# Patient Record
Sex: MALE | Race: WHITE | NOT HISPANIC OR LATINO
[De-identification: names, ages, dates, MRNs, and addresses within clinical notes are randomized per-mention and may not be internally consistent; named-entity substitution may affect disease eponyms.]

---

## 2018-12-17 ENCOUNTER — TRANSCRIPTION ENCOUNTER (OUTPATIENT)
Age: 53
End: 2018-12-17

## 2018-12-26 ENCOUNTER — TRANSCRIPTION ENCOUNTER (OUTPATIENT)
Age: 53
End: 2018-12-26

## 2019-01-02 ENCOUNTER — APPOINTMENT (OUTPATIENT)
Dept: INTERNAL MEDICINE | Facility: CLINIC | Age: 54
End: 2019-01-02

## 2019-01-02 PROBLEM — Z00.00 ENCOUNTER FOR PREVENTIVE HEALTH EXAMINATION: Status: ACTIVE | Noted: 2019-01-02

## 2019-11-17 ENCOUNTER — TRANSCRIPTION ENCOUNTER (OUTPATIENT)
Age: 54
End: 2019-11-17

## 2019-12-09 ENCOUNTER — TRANSCRIPTION ENCOUNTER (OUTPATIENT)
Age: 54
End: 2019-12-09

## 2020-01-26 ENCOUNTER — TRANSCRIPTION ENCOUNTER (OUTPATIENT)
Age: 55
End: 2020-01-26

## 2020-05-13 ENCOUNTER — TRANSCRIPTION ENCOUNTER (OUTPATIENT)
Age: 55
End: 2020-05-13

## 2020-12-28 ENCOUNTER — FORM ENCOUNTER (OUTPATIENT)
Age: 55
End: 2020-12-28

## 2020-12-29 ENCOUNTER — APPOINTMENT (OUTPATIENT)
Dept: INTERNAL MEDICINE | Facility: CLINIC | Age: 55
End: 2020-12-29
Payer: COMMERCIAL

## 2020-12-29 ENCOUNTER — TRANSCRIPTION ENCOUNTER (OUTPATIENT)
Age: 55
End: 2020-12-29

## 2020-12-29 DIAGNOSIS — Z80.3 FAMILY HISTORY OF MALIGNANT NEOPLASM OF BREAST: ICD-10-CM

## 2020-12-29 DIAGNOSIS — Z87.898 PERSONAL HISTORY OF OTHER SPECIFIED CONDITIONS: ICD-10-CM

## 2020-12-29 DIAGNOSIS — J45.909 UNSPECIFIED ASTHMA, UNCOMPLICATED: ICD-10-CM

## 2020-12-29 DIAGNOSIS — I10 ESSENTIAL (PRIMARY) HYPERTENSION: ICD-10-CM

## 2020-12-29 DIAGNOSIS — Z83.3 FAMILY HISTORY OF DIABETES MELLITUS: ICD-10-CM

## 2020-12-29 DIAGNOSIS — E78.5 HYPERLIPIDEMIA, UNSPECIFIED: ICD-10-CM

## 2020-12-29 DIAGNOSIS — E11.9 TYPE 2 DIABETES MELLITUS W/OUT COMPLICATIONS: ICD-10-CM

## 2020-12-29 DIAGNOSIS — Z78.9 OTHER SPECIFIED HEALTH STATUS: ICD-10-CM

## 2020-12-29 DIAGNOSIS — U07.1 COVID-19: ICD-10-CM

## 2020-12-29 PROCEDURE — 99203 OFFICE O/P NEW LOW 30 MIN: CPT | Mod: 95

## 2020-12-29 RX ORDER — GLYBURIDE AND METFORMIN HYDROCHLORIDE 2.5; 5 MG/1; MG/1
2.5-5 TABLET, FILM COATED ORAL
Refills: 0 | Status: ACTIVE | COMMUNITY

## 2020-12-29 RX ORDER — QUINAPRIL HYDROCHLORIDE 10 MG/1
10 TABLET, FILM COATED ORAL
Refills: 0 | Status: ACTIVE | COMMUNITY

## 2020-12-29 RX ORDER — ASPIRIN ENTERIC COATED TABLETS 81 MG 81 MG/1
81 TABLET, DELAYED RELEASE ORAL
Refills: 0 | Status: ACTIVE | COMMUNITY

## 2020-12-29 RX ORDER — ATORVASTATIN CALCIUM 20 MG/1
20 TABLET, FILM COATED ORAL
Refills: 0 | Status: ACTIVE | COMMUNITY

## 2020-12-29 RX ORDER — MELOXICAM 15 MG/1
15 TABLET ORAL
Refills: 0 | Status: ACTIVE | COMMUNITY

## 2020-12-31 ENCOUNTER — OUTPATIENT (OUTPATIENT)
Dept: INPATIENT UNIT | Facility: HOSPITAL | Age: 55
LOS: 1 days | End: 2020-12-31
Payer: COMMERCIAL

## 2020-12-31 ENCOUNTER — APPOINTMENT (OUTPATIENT)
Dept: DISASTER EMERGENCY | Facility: HOSPITAL | Age: 55
End: 2020-12-31

## 2020-12-31 VITALS
HEART RATE: 94 BPM | TEMPERATURE: 99 F | SYSTOLIC BLOOD PRESSURE: 137 MMHG | DIASTOLIC BLOOD PRESSURE: 74 MMHG | RESPIRATION RATE: 18 BRPM | OXYGEN SATURATION: 95 %

## 2020-12-31 VITALS
DIASTOLIC BLOOD PRESSURE: 75 MMHG | SYSTOLIC BLOOD PRESSURE: 114 MMHG | TEMPERATURE: 99 F | RESPIRATION RATE: 20 BRPM | OXYGEN SATURATION: 95 % | HEART RATE: 100 BPM

## 2020-12-31 DIAGNOSIS — U07.1 COVID-19: ICD-10-CM

## 2020-12-31 PROCEDURE — M0239: CPT

## 2020-12-31 RX ORDER — BAMLANIVIMAB 35 MG/ML
700 INJECTION, SOLUTION INTRAVENOUS ONCE
Refills: 0 | Status: COMPLETED | OUTPATIENT
Start: 2020-12-31 | End: 2020-12-31

## 2020-12-31 RX ORDER — ACETAMINOPHEN 500 MG
650 TABLET ORAL ONCE
Refills: 0 | Status: COMPLETED | OUTPATIENT
Start: 2020-12-31 | End: 2020-12-31

## 2020-12-31 RX ADMIN — Medication 650 MILLIGRAM(S): at 14:34

## 2020-12-31 RX ADMIN — BAMLANIVIMAB 200 MILLIGRAM(S): 35 INJECTION, SOLUTION INTRAVENOUS at 12:34

## 2020-12-31 NOTE — CHART NOTE - NSCHARTNOTEFT_GEN_A_CORE
CC: Monoclonal Antibody Infusion/COVID 19 Positive    55 year old male with PMHx HTN, HLD, type 2 DM sent in by Dr. Durham to outpatient infusion clinic for Monoclonal Antibody Infusion with Bamlanivimab. Patient with onset of symptoms 12/25/2020 (endorses fever, malaise, myalgias, cough). Tested positive for COVID-19 on12/27/2020.        Vital Signs Last 24 Hrs  T(C): 37.1 (31 Dec 2020 13:05), Max: 37.1 (31 Dec 2020 12:34)  T(F): 98.8 (31 Dec 2020 13:05), Max: 98.8 (31 Dec 2020 12:34)  HR: 92 (31 Dec 2020 13:05) (92 - 100)  BP: 114/78 (31 Dec 2020 13:05) (110/76 - 114/78)  RR: 18 (31 Dec 2020 13:05) (18 - 20)  SpO2: 96% (31 Dec 2020 13:05) (95% - 96%)      Physical Exam:   Appearance: NAD, A&O x3  HEENT:  Normal oral mucosa  Lymphatic: No lymphadenopathy  Cardiovascular: Normal S1 S2, RRR, No JVD  Respiratory: Lungs clear to auscultation, nonlabored on room air	  Gastrointestinal:  Soft, Non-tender, + BS	  Skin: Warm and dry  Neurologic: Non-focal  Extremities: Normal range of motion, no edema      ASSESSMENT:  Pt is a 55 year old male who is COVID-19 positive who presents to infusion center for Monoclonal antibody infusion (Bamlanivimab).    Symptoms/ Criteria: fever, malaise, myalgias, cough  Risk Profile includes: DM, HTN        PLAN:  - Infusion procedure explained to patient   - Consent for monoclonal antibody infusion obtained   - Risks & benefits discussed/all questions answered  - Infuse  Bamlanivimab 700mg  IV over one hour   - Observe patient for one hour post infusion        I have reviewed the Bamlanivimab Emergency Use Authorization (EUA) and I have provided the patient or patient's caregiver with the following information:  1. FDA has authorized emergency use Bamlanivimab, which is not an FDA-approved biological product.  2. The patient or patient's caregiver has the option to accept or refuse administration of Bamlanivimab.   3. The significant known and potential risks and benefits of Bamlanivimab and the extent to which such risks and benefits are unknown.  4. Information on available alternative treatments and risks and benefits of those alternatives.      Patient tolerated infusion well denies complaints of chest pain/SOB/dizziness/ palpitations  Vital signs hemodynamically stable for discharge home  Discharge instructions given/ fact sheet included  Patient to follow-up with PCP as needed          Marck Smith PA-C CC: Monoclonal Antibody Infusion/COVID 19 Positive    55 year old male with PMHx HTN, HLD, type 2 DM sent in by Dr. Durham to outpatient infusion clinic for Monoclonal Antibody Infusion with Bamlanivimab. Patient with onset of symptoms 12/25/2020 (endorses fever, malaise, myalgias, cough). Tested positive for COVID-19 on12/27/2020.        Vital Signs Last 24 Hrs  T(C): 37.1 (31 Dec 2020 13:05), Max: 37.1 (31 Dec 2020 12:34)  T(F): 98.8 (31 Dec 2020 13:05), Max: 98.8 (31 Dec 2020 12:34)  HR: 92 (31 Dec 2020 13:05) (92 - 100)  BP: 114/78 (31 Dec 2020 13:05) (110/76 - 114/78)  RR: 18 (31 Dec 2020 13:05) (18 - 20)  SpO2: 96% (31 Dec 2020 13:05) (95% - 96%)      Vital Signs Last 24 Hrs  T(C): 37.2 (31 Dec 2020 14:34), Max: 37.2 (31 Dec 2020 14:34)  T(F): 99 (31 Dec 2020 14:34), Max: 99 (31 Dec 2020 14:34)  HR: 94 (31 Dec 2020 14:34) (92 - 100)  BP: 137/74 (31 Dec 2020 14:34) (110/76 - 137/74)  RR: 18 (31 Dec 2020 14:34) (18 - 20)  SpO2: 95% (31 Dec 2020 14:34) (95% - 96%)      Physical Exam:   Appearance: NAD, A&O x3  HEENT:  Normal oral mucosa  Lymphatic: No lymphadenopathy  Cardiovascular: Normal S1 S2, RRR, No JVD  Respiratory: Lungs clear to auscultation, nonlabored on room air	  Gastrointestinal:  Soft, Non-tender, + BS	  Skin: Warm and dry  Neurologic: Non-focal  Extremities: Normal range of motion, no edema      ASSESSMENT:  Pt is a 55 year old male who is COVID-19 positive who presents to infusion center for Monoclonal antibody infusion (Bamlanivimab).    Symptoms/ Criteria: fever, malaise, myalgias, cough  Risk Profile includes: DM, HTN      PLAN:  - Infusion procedure explained to patient   - Consent for monoclonal antibody infusion obtained   - Risks & benefits discussed/all questions answered  - Infuse Bamlanivimab 700mg  IV over one hour   - Observe patient for one hour post infusion        I have reviewed the Bamlanivimab Emergency Use Authorization (EUA) and I have provided the patient or patient's caregiver with the following information:  1. FDA has authorized emergency use Bamlanivimab, which is not an FDA-approved biological product.  2. The patient or patient's caregiver has the option to accept or refuse administration of Bamlanivimab.   3. The significant known and potential risks and benefits of Bamlanivimab and the extent to which such risks and benefits are unknown.  4. Information on available alternative treatments and risks and benefits of those alternatives.      Patient tolerated infusion well denies complaints of chest pain/SOB/dizziness/ palpitations  Vital signs hemodynamically stable for discharge home  Discharge instructions given/ fact sheet included  Patient to follow-up with PCP as needed      Marck Smith PA-C

## 2020-12-31 NOTE — CHART NOTE - NSCHARTNOTEFT_GEN_A_CORE
pt tolerated infusion well denies complaints of chest pain/sob/dizziness/palps  VSS/ stable for d/c home.  D/C instructions given /fact sheet included  pt to follow up with PMD as needed.

## 2021-01-02 ENCOUNTER — TRANSCRIPTION ENCOUNTER (OUTPATIENT)
Age: 56
End: 2021-01-02

## 2021-01-04 ENCOUNTER — TRANSCRIPTION ENCOUNTER (OUTPATIENT)
Age: 56
End: 2021-01-04

## 2022-10-01 ENCOUNTER — NON-APPOINTMENT (OUTPATIENT)
Age: 57
End: 2022-10-01

## 2023-12-23 RX ORDER — ALBUTEROL SULFATE 90 UG/1
108 (90 BASE) INHALANT RESPIRATORY (INHALATION)
Qty: 1 | Refills: 3 | Status: ACTIVE | COMMUNITY
Start: 2023-12-23 | End: 1900-01-01

## 2023-12-23 RX ORDER — LEVALBUTEROL TARTRATE 45 UG/1
45 AEROSOL, METERED ORAL EVERY 6 HOURS
Qty: 1 | Refills: 3 | Status: ACTIVE | COMMUNITY
Start: 2023-12-23 | End: 1900-01-01